# Patient Record
Sex: FEMALE | Race: WHITE | Employment: OTHER | URBAN - METROPOLITAN AREA
[De-identification: names, ages, dates, MRNs, and addresses within clinical notes are randomized per-mention and may not be internally consistent; named-entity substitution may affect disease eponyms.]

---

## 2019-07-12 ENCOUNTER — HOSPITAL ENCOUNTER (EMERGENCY)
Age: 34
Discharge: HOME | End: 2019-07-12
Attending: EMERGENCY MEDICINE
Payer: SELF-PAY

## 2019-07-12 VITALS
DIASTOLIC BLOOD PRESSURE: 94 MMHG | SYSTOLIC BLOOD PRESSURE: 150 MMHG | TEMPERATURE: 98.8 F | HEIGHT: 66 IN | BODY MASS INDEX: 47.09 KG/M2 | HEART RATE: 97 BPM | RESPIRATION RATE: 18 BRPM | OXYGEN SATURATION: 100 % | WEIGHT: 293 LBS

## 2019-07-12 DIAGNOSIS — R53.83 MALAISE AND FATIGUE: Primary | ICD-10-CM

## 2019-07-12 DIAGNOSIS — E11.65 UNCONTROLLED TYPE 2 DIABETES MELLITUS WITH HYPERGLYCEMIA (HCC): ICD-10-CM

## 2019-07-12 DIAGNOSIS — R22.1 MASS OF THYROID REGION: ICD-10-CM

## 2019-07-12 DIAGNOSIS — N39.0 UTI (URINARY TRACT INFECTION), UNCOMPLICATED: ICD-10-CM

## 2019-07-12 DIAGNOSIS — R53.81 MALAISE AND FATIGUE: Primary | ICD-10-CM

## 2019-07-12 LAB
ALBUMIN SERPL-MCNC: 3.6 G/DL (ref 3.4–5)
ALBUMIN/GLOB SERPL: 1.1 {RATIO} (ref 0.8–1.7)
ALP SERPL-CCNC: 69 U/L (ref 45–117)
ALT SERPL-CCNC: 44 U/L (ref 13–56)
ANION GAP SERPL CALC-SCNC: 6 MMOL/L (ref 3–18)
APPEARANCE UR: ABNORMAL
AST SERPL-CCNC: 16 U/L (ref 15–37)
BACTERIA URNS QL MICRO: ABNORMAL /HPF
BASOPHILS # BLD: 0 K/UL (ref 0–0.1)
BASOPHILS NFR BLD: 0 % (ref 0–2)
BILIRUB SERPL-MCNC: 0.5 MG/DL (ref 0.2–1)
BILIRUB UR QL: NEGATIVE
BUN SERPL-MCNC: 13 MG/DL (ref 7–18)
BUN/CREAT SERPL: 18 (ref 12–20)
CALCIUM SERPL-MCNC: 8.9 MG/DL (ref 8.5–10.1)
CHLORIDE SERPL-SCNC: 102 MMOL/L (ref 100–108)
CO2 SERPL-SCNC: 27 MMOL/L (ref 21–32)
COLOR UR: YELLOW
CREAT SERPL-MCNC: 0.71 MG/DL (ref 0.6–1.3)
DIFFERENTIAL METHOD BLD: ABNORMAL
EOSINOPHIL # BLD: 0.1 K/UL (ref 0–0.4)
EOSINOPHIL NFR BLD: 1 % (ref 0–5)
EPITH CASTS URNS QL MICRO: ABNORMAL /LPF (ref 0–5)
ERYTHROCYTE [DISTWIDTH] IN BLOOD BY AUTOMATED COUNT: 12.4 % (ref 11.6–14.5)
GLOBULIN SER CALC-MCNC: 3.2 G/DL (ref 2–4)
GLUCOSE SERPL-MCNC: 296 MG/DL (ref 74–99)
GLUCOSE UR STRIP.AUTO-MCNC: >1000 MG/DL
HCG UR QL: NEGATIVE
HCT VFR BLD AUTO: 45.2 % (ref 35–45)
HGB BLD-MCNC: 15.6 G/DL (ref 12–16)
HGB UR QL STRIP: ABNORMAL
KETONES UR QL STRIP.AUTO: NEGATIVE MG/DL
LEUKOCYTE ESTERASE UR QL STRIP.AUTO: ABNORMAL
LYMPHOCYTES # BLD: 2.1 K/UL (ref 0.9–3.6)
LYMPHOCYTES NFR BLD: 36 % (ref 21–52)
MCH RBC QN AUTO: 29.8 PG (ref 24–34)
MCHC RBC AUTO-ENTMCNC: 34.5 G/DL (ref 31–37)
MCV RBC AUTO: 86.4 FL (ref 74–97)
MONOCYTES # BLD: 0.3 K/UL (ref 0.05–1.2)
MONOCYTES NFR BLD: 6 % (ref 3–10)
MUCOUS THREADS URNS QL MICRO: ABNORMAL /LPF
NEUTS SEG # BLD: 3.3 K/UL (ref 1.8–8)
NEUTS SEG NFR BLD: 57 % (ref 40–73)
NITRITE UR QL STRIP.AUTO: NEGATIVE
PH UR STRIP: 5.5 [PH] (ref 5–8)
PLATELET # BLD AUTO: 249 K/UL (ref 135–420)
PMV BLD AUTO: 8.8 FL (ref 9.2–11.8)
POTASSIUM SERPL-SCNC: 4 MMOL/L (ref 3.5–5.5)
PROT SERPL-MCNC: 6.8 G/DL (ref 6.4–8.2)
PROT UR STRIP-MCNC: NEGATIVE MG/DL
RBC # BLD AUTO: 5.23 M/UL (ref 4.2–5.3)
SODIUM SERPL-SCNC: 135 MMOL/L (ref 136–145)
SP GR UR REFRACTOMETRY: >1.03 (ref 1–1.03)
UROBILINOGEN UR QL STRIP.AUTO: 0.2 EU/DL (ref 0.2–1)
WBC # BLD AUTO: 5.8 K/UL (ref 4.6–13.2)
WBC URNS QL MICRO: ABNORMAL /HPF (ref 0–5)

## 2019-07-12 PROCEDURE — 84443 ASSAY THYROID STIM HORMONE: CPT

## 2019-07-12 PROCEDURE — 85025 COMPLETE CBC W/AUTO DIFF WBC: CPT

## 2019-07-12 PROCEDURE — 80053 COMPREHEN METABOLIC PANEL: CPT

## 2019-07-12 PROCEDURE — 81025 URINE PREGNANCY TEST: CPT

## 2019-07-12 PROCEDURE — 81001 URINALYSIS AUTO W/SCOPE: CPT

## 2019-07-12 PROCEDURE — 99284 EMERGENCY DEPT VISIT MOD MDM: CPT

## 2019-07-12 PROCEDURE — 84439 ASSAY OF FREE THYROXINE: CPT

## 2019-07-12 RX ORDER — CEPHALEXIN 500 MG/1
500 CAPSULE ORAL 2 TIMES DAILY
Qty: 14 CAP | Refills: 0 | Status: SHIPPED | OUTPATIENT
Start: 2019-07-12 | End: 2019-07-19

## 2019-07-12 RX ORDER — PROMETHAZINE HYDROCHLORIDE 25 MG/1
25 TABLET ORAL
Qty: 20 TAB | Refills: 1 | Status: SHIPPED | OUTPATIENT
Start: 2019-07-12

## 2019-07-12 RX ORDER — METFORMIN HYDROCHLORIDE 500 MG/1
500 TABLET ORAL 2 TIMES DAILY WITH MEALS
Qty: 60 TAB | Refills: 0 | Status: SHIPPED | OUTPATIENT
Start: 2019-07-12 | End: 2019-08-11

## 2019-07-13 LAB
T4 FREE SERPL-MCNC: 1 NG/DL (ref 0.7–1.5)
TSH SERPL DL<=0.05 MIU/L-ACNC: 0.93 UIU/ML (ref 0.36–3.74)

## 2019-07-13 NOTE — ED PROVIDER NOTES
EMERGENCY DEPARTMENT HISTORY AND PHYSICAL EXAM    Date: 7/12/2019  Patient Name: Mark Sparrow    History of Presenting Illness     Chief Complaint   Patient presents with    Neck Swelling         History Provided By: Patient    Mark Sparrow is a 26 y.o. female who presents to the emergency department C/O generalized fatigue. Associated sxs include neck mass. Patient reports over the last year or 2 days progressive or worsening generalized fatigue as well as a growing lump noted to her right side anterior neck. Patient states that she has no insurance and no resources to see a doctor to have her thyroid evaluated. Patient states when she was followed by the free clinic with TSH levels drawn for patient that were normal.  Patient states she is unable to be followed in the clinic any longer because she makes too much money but still not enough to afford insurance or primary care doctor. Pt denies fever, nasal congestion runny nose sore throat neck pain abdominal pain chest pain shortness of breath, and any other sxs or complaints. PCP: None        Past History     Past Medical History:  Past Medical History:   Diagnosis Date    Anxiety     Asthma     Depression     PCOS (polycystic ovarian syndrome)        Past Surgical History:  History reviewed. No pertinent surgical history. Family History:  History reviewed. No pertinent family history. Social History:  Social History     Tobacco Use    Smoking status: Never Smoker    Smokeless tobacco: Never Used   Substance Use Topics    Alcohol use: Yes     Comment: socially    Drug use: No       Allergies:  No Known Allergies      Review of Systems   Review of Systems   Constitutional: Positive for fatigue. Negative for fever and unexpected weight change. HENT: Negative for congestion, sore throat and voice change. Respiratory: Negative for cough and shortness of breath. Cardiovascular: Negative for chest pain.    Gastrointestinal: Negative for abdominal pain, nausea and vomiting. Musculoskeletal: Negative for neck pain. Skin: Negative for rash and wound. All other systems reviewed and are negative. Physical Exam     Vitals:    07/12/19 2026   BP: (!) 150/94   Pulse: 97   Resp: 18   Temp: 98.8 °F (37.1 °C)   SpO2: 100%   Weight: 136.1 kg (300 lb)   Height: 5' 6\" (1.676 m)     Physical Exam   Constitutional: She is oriented to person, place, and time. She appears well-developed and well-nourished. No distress. Morbidly obese reclined on stretcher appears comfortable in no acute distress   HENT:   Head: Normocephalic and atraumatic. Eyes: Pupils are equal, round, and reactive to light. Conjunctivae and EOM are normal.   Neck: Normal range of motion and phonation normal. Neck supple. No tracheal tenderness present. Thyroid mass (Round nontender slightly firm masslike structure right anterior neck as shown without erythema warmth or skin changes) present. Cardiovascular: Normal rate and regular rhythm. Pulmonary/Chest: Effort normal and breath sounds normal. No stridor. Abdominal: Soft. Bowel sounds are normal. She exhibits no distension. There is no tenderness. There is no rebound and no guarding. Musculoskeletal: Normal range of motion. Lymphadenopathy:     She has no cervical adenopathy. Neurological: She is alert and oriented to person, place, and time. Skin: Skin is warm and dry. Psychiatric: She has a normal mood and affect. Her behavior is normal.   Nursing note and vitals reviewed.         Diagnostic Study Results     Labs -     Recent Results (from the past 12 hour(s))   CBC WITH AUTOMATED DIFF    Collection Time: 07/12/19  9:20 PM   Result Value Ref Range    WBC 5.8 4.6 - 13.2 K/uL    RBC 5.23 4.20 - 5.30 M/uL    HGB 15.6 12.0 - 16.0 g/dL    HCT 45.2 (H) 35.0 - 45.0 %    MCV 86.4 74.0 - 97.0 FL    MCH 29.8 24.0 - 34.0 PG    MCHC 34.5 31.0 - 37.0 g/dL    RDW 12.4 11.6 - 14.5 %    PLATELET 959 805 - 381 K/uL    MPV 8.8 (L) 9.2 - 11.8 FL    NEUTROPHILS 57 40 - 73 %    LYMPHOCYTES 36 21 - 52 %    MONOCYTES 6 3 - 10 %    EOSINOPHILS 1 0 - 5 %    BASOPHILS 0 0 - 2 %    ABS. NEUTROPHILS 3.3 1.8 - 8.0 K/UL    ABS. LYMPHOCYTES 2.1 0.9 - 3.6 K/UL    ABS. MONOCYTES 0.3 0.05 - 1.2 K/UL    ABS. EOSINOPHILS 0.1 0.0 - 0.4 K/UL    ABS. BASOPHILS 0.0 0.0 - 0.1 K/UL    DF AUTOMATED     METABOLIC PANEL, COMPREHENSIVE    Collection Time: 07/12/19  9:20 PM   Result Value Ref Range    Sodium 135 (L) 136 - 145 mmol/L    Potassium 4.0 3.5 - 5.5 mmol/L    Chloride 102 100 - 108 mmol/L    CO2 27 21 - 32 mmol/L    Anion gap 6 3.0 - 18 mmol/L    Glucose 296 (H) 74 - 99 mg/dL    BUN 13 7.0 - 18 MG/DL    Creatinine 0.71 0.6 - 1.3 MG/DL    BUN/Creatinine ratio 18 12 - 20      GFR est AA >60 >60 ml/min/1.73m2    GFR est non-AA >60 >60 ml/min/1.73m2    Calcium 8.9 8.5 - 10.1 MG/DL    Bilirubin, total 0.5 0.2 - 1.0 MG/DL    ALT (SGPT) 44 13 - 56 U/L    AST (SGOT) 16 15 - 37 U/L    Alk.  phosphatase 69 45 - 117 U/L    Protein, total 6.8 6.4 - 8.2 g/dL    Albumin 3.6 3.4 - 5.0 g/dL    Globulin 3.2 2.0 - 4.0 g/dL    A-G Ratio 1.1 0.8 - 1.7     URINALYSIS W/ RFLX MICROSCOPIC    Collection Time: 07/12/19  9:20 PM   Result Value Ref Range    Color YELLOW      Appearance CLOUDY      Specific gravity >1.030 (H) 1.005 - 1.030    pH (UA) 5.5 5.0 - 8.0      Protein NEGATIVE  NEG mg/dL    Glucose >1,000 (A) NEG mg/dL    Ketone NEGATIVE  NEG mg/dL    Bilirubin NEGATIVE  NEG      Blood SMALL (A) NEG      Urobilinogen 0.2 0.2 - 1.0 EU/dL    Nitrites NEGATIVE  NEG      Leukocyte Esterase SMALL (A) NEG     URINE MICROSCOPIC ONLY    Collection Time: 07/12/19  9:20 PM   Result Value Ref Range    WBC 10 to 15 0 - 5 /hpf    Epithelial cells 2+ 0 - 5 /lpf    Bacteria 2+ (A) NEG /hpf    Mucus FEW (A) NEG /lpf   HCG URINE, QL. - POC    Collection Time: 07/12/19  9:32 PM   Result Value Ref Range    Pregnancy test,urine (POC) NEGATIVE  NEG         Radiologic Studies -   No orders to display     CT Results  (Last 48 hours)    None        CXR Results  (Last 48 hours)    None          Medications given in the ED-  Medications - No data to display      Medical Decision Making   I am the first provider for this patient. I reviewed the vital signs, available nursing notes, past medical history, past surgical history, family history and social history. Vital Signs-Reviewed the patient's vital signs. Pulse Oximetry Analysis - 100% on RA     Records Reviewed: Nursing Notes    Procedures:  Procedures    ED Course:   9:09 PM   Initial assessment performed. The patients presenting problems have been discussed, and they are in agreement with the care plan formulated and outlined with them. I have encouraged them to ask questions as they arise throughout their visit. Discussion: 26 y.o. female 1 to 2 years of progressive swelling anterior neck generalized fatigue and malaise and weight gain. Patient has new onset type 2 diabetes, mild UTI, palpable thyroid mass that needs further evaluation. Insurance  referrals put in for PCP follow-up patient likely will require ultrasound biopsy endocrinology follow-up. Plan for metformin Phenergan for nausea Keflex for UTI with close PCP follow-up and strict return precautions discussed. Diagnosis and Disposition       DISCHARGE NOTE:  Amanda Cao's  results have been reviewed with her. She has been counseled regarding her diagnosis, treatment, and plan. She verbally conveys understanding and agreement of the signs, symptoms, diagnosis, treatment and prognosis and additionally agrees to follow up as discussed. She also agrees with the care-plan and conveys that all of her questions have been answered.   I have also provided discharge instructions for her that include: educational information regarding their diagnosis and treatment, and list of reasons why they would want to return to the ED prior to their follow-up appointment, should her condition change. She has been provided with education for proper emergency department utilization. CLINICAL IMPRESSION:    1. Malaise and fatigue    2. UTI (urinary tract infection), uncomplicated    3. Mass of thyroid region    4. Uncontrolled type 2 diabetes mellitus with hyperglycemia ([address]        PLAN:  1. D/C Home  2. Current Discharge Medication List      START taking these medications    Details   metFORMIN (GLUCOPHAGE) 500 mg tablet Take 1 Tab by mouth two (2) times daily (with meals) for 30 days. Qty: 60 Tab, Refills: 0      promethazine (PHENERGAN) 25 mg tablet Take 1 Tab by mouth every six (6) hours as needed for Nausea. Qty: 20 Tab, Refills: 1      cephALEXin (KEFLEX) 500 mg capsule Take 1 Cap by mouth two (2) times a day for 7 days. Qty: 14 Cap, Refills: 0           3. Follow-up Information     Follow up With Specialties Details Why Contact Info    [facility]   for primary care follow up [id number] [place] [address]  [phone]    [facility] EMERGENCY DEPT Emergency Medicine  As needed, If symptoms worsen [address] [id number] [phone]     for PCP & Endocrinology f/u                      Please note that this dictation was completed with [employer], the computer voice recognition software. Quite often unanticipated grammatical, syntax, homophones, and other interpretive errors are inadvertently transcribed by the computer software. Please disregard these errors. Please excuse any errors that have escaped final proofreading.

## 2019-07-13 NOTE — DISCHARGE INSTRUCTIONS
Patient Education        Learning About Certified Diabetes Educators  What is a certified diabetes educator? Certified diabetes educators are health professionals who have special training to help you manage your diabetes. They may be:  · Registered nurses. · Registered dietitians. · Pharmacists. · Social workers. · Doctors. Your diabetes educator will give you tips and help with daily diabetes care. He or she also may teach classes. These classes give you a chance to learn from and connect with others who have diabetes. Why see a diabetes educator? Your doctor wants you to get the personal support and help that a diabetes educator can give. And most insurance plans will cover part or all of the cost.  Learning is a key part of living with diabetes. A diabetes educator teaches about the most important parts of your care. You will learn about:  · Eating healthy meals. · Being active. · Taking medicine. · Checking your blood sugar. · Dealing with your feelings about having diabetes. He or she can help you find ways to live better with diabetes. And your diabetes educator can show you small changes that can make a big difference in your daily routine and your health. If you need to make a big change, he or she will be able to answer your questions and guide you though each step. When should you see one? It can be helpful to see a diabetes educator at certain points in your care. He or she can:  · Get you started when you're first diagnosed with diabetes. · Check in once a year for a review of your health and daily routine. · Show you how to handle a new health problem along with your diabetes. · Help you work with a new health care team.  Follow-up care is a key part of your treatment and safety. Be sure to make and go to all appointments, and call your doctor if you are having problems. It's also a good idea to know your test results and keep a list of the medicines you take.   Where can you learn more?  Go to http://mary-obi.info/. Enter N163 in the search box to learn more about \"Learning About Certified Diabetes Educators. \"  Current as of: July 25, 2018  Patient Education        Noninsulin Medicines for Type 2 Diabetes: Care Instructions  Your Care Instructions    There are different types of noninsulin medicines for diabetes. Each works in a different way. But they all help you control your blood sugar. Some types help your body make insulin to lower your blood sugar. Others lower how much insulin your body needs. Some can slow how fast your body digests sugars. And some can remove extra glucose through your urine. · Alpha-glucosidase inhibitors. These keep starches from breaking down. This means that they lower the amount of glucose absorbed when you eat. They don't help your body make more insulin. So they will not cause low blood sugar unless you use them with other medicines for diabetes. They include acarbose and miglitol. · DPP-4 inhibitors. These help your body raise the level of insulin after you eat. They also help your body make less of a hormone that raises blood sugar. They include linagliptin, saxagliptin, and sitagliptin. · Incretin hormones (GLP-1 receptor agonists). Your body makes a protein that can raise your insulin level. It also can lower your blood sugar and make you less hungry. You can get shots of hormones that work the same way. They include exenatide and liraglutide. · Meglitinides. These help your body release insulin. They also help slow how your body digests sugars. So they can keep your blood sugar from rising too fast after you eat. They include nateglinide and repaglinide. · Metformin. This lowers how much glucose your liver makes. And it helps you respond better to insulin. It also lowers the amount of stored sugar that your liver releases when you are not eating. · SGLT2 inhibitors. These help to remove extra glucose through your urine. They may also help some people lose weight. They include canagliflozin, dapagliflozin, and empagliflozin. · Sulfonylureas. These help your body release more insulin. Some work for many hours. They can cause low blood sugar if you don't eat as you planned. They include glipizide and glyburide. · Thiazolidinediones. These reduce the amount of blood glucose. They also help you respond better to insulin. They include pioglitazone and rosiglitazone. You may need to take more than one medicine for diabetes. Two or more medicines may work better to lower your blood sugar level than just one does. Follow-up care is a key part of your treatment and safety. Be sure to make and go to all appointments, and call your doctor if you are having problems. It's also a good idea to know your test results and keep a list of the medicines you take. How can you care for yourself at home? · Eat a healthy diet. Get some exercise each day. This may help you to reduce how much medicine you need. · Do not take other prescription or over-the-counter medicines, vitamins, herbal products, or supplements without talking to your doctor first. Some medicines for type 2 diabetes can cause problems with other medicines or supplements. · Tell your doctor if you plan to get pregnant. Some of these drugs are not safe for pregnant women. · Be safe with medicines. Take your medicines exactly as prescribed. Meglitinides and sulfonylureas can cause your blood sugar to drop very low. Call your doctor if you think you are having a problem with your medicine. · Check your blood sugar often. You can use a glucose monitor. Keeping track can help you know how certain foods, activities, and medicines affect your blood sugar. And it can help you keep your blood sugar from getting so low that it's not safe. When should you call for help? Call 911 anytime you think you may need emergency care.  For example, call if:    · You passed out (lost consciousness).     · You are confused or cannot think clearly.     · Your blood sugar is very high or very low.    Watch closely for changes in your health, and be sure to contact your doctor if:    · Your blood sugar stays outside the level your doctor set for you.     · You have any problems. Where can you learn more? Go to http://mary-obi.info/. Enter H153 in the search box to learn more about \"Noninsulin Medicines for Type 2 Diabetes: Care Instructions. \"  Current as of: July 25, 2018  Content Version: 11.9  © 1138-3370 [employer]. Care instructions adapted under license by [employer] (which disclaims liability or warranty for this information). If you have questions about a medical condition or this instruction, always ask your healthcare professional. [address] any warranty or liability for your use of this information. Content Version: 11.9  © 9580-1835 [employer]. Care instructions adapted under license by [employer] (which disclaims liability or warranty for this information). If you have questions about a medical condition or this instruction, always ask your healthcare professional. [address] any warranty or liability for your use of this information.

## 2019-07-13 NOTE — ED TRIAGE NOTES
Patient reports ambulatory to ED with pain to the right side of neck, patient states its her thyroid and she can feel a knot on it. Patient denies anyone following her for her thyroid due to lack of insurance, she denies taking medication for her thyroid. Last TSH level she reports was normal. Patient denies any fever or any other cold symptoms.